# Patient Record
Sex: MALE | Race: ASIAN | Employment: FULL TIME | URBAN - METROPOLITAN AREA
[De-identification: names, ages, dates, MRNs, and addresses within clinical notes are randomized per-mention and may not be internally consistent; named-entity substitution may affect disease eponyms.]

---

## 2021-03-11 ENCOUNTER — OCC HEALTH (OUTPATIENT)
Dept: OTHER | Facility: HOSPITAL | Age: 41
End: 2021-03-11
Attending: PREVENTIVE MEDICINE

## 2021-03-11 ENCOUNTER — HOSPITAL ENCOUNTER (OUTPATIENT)
Dept: GENERAL RADIOLOGY | Facility: HOSPITAL | Age: 41
Discharge: HOME | End: 2021-03-11
Attending: PREVENTIVE MEDICINE

## 2021-03-11 DIAGNOSIS — S39.012A LUMBAR STRAIN: ICD-10-CM

## 2021-03-11 DIAGNOSIS — S39.012A LUMBAR STRAIN: Primary | ICD-10-CM

## 2021-03-11 PROCEDURE — 72110 X-RAY EXAM L-2 SPINE 4/>VWS: CPT | Performed by: PREVENTIVE MEDICINE

## 2021-03-11 RX ORDER — ACETAMINOPHEN AND CODEINE PHOSPHATE 300; 30 MG/1; MG/1
TABLET ORAL
Qty: 14 TABLET | Refills: 0 | Status: SHIPPED | OUTPATIENT
Start: 2021-03-11 | End: 2021-03-18

## 2021-03-11 NOTE — PATIENT INSTRUCTIONS
Rest    Ice to back on and off    For pain, may take ES Tylenol for pain  OR Tyl with codeine 1-2 tabs  every 6 hrs  As needed for pain      Text SUPPORT1 to 63384 to learn if you may be eligible for financial support with your medication(s).     [employer] doctor or health care professional if they continue or are bothersome):  · constipation  · dry mouth  · nausea, vomiting  · tiredness  What may interact with this medicine?   This medicine may interact with the following medications:  · alcohol  · antihista medicine may cause accidental overdose and death if it taken by other adults, children, or pets. Mix any unused medicine with a substance like cat litter or coffee grounds.  Then throw the medicine away in a sealed container like a sealed bag or a coffee ca to getting and using a drug for a nonmedical reason. If you have pain, you have a medical reason to take pain drug. Your health care provider will tell you how much drug to take.  If your health care provider wants you to stop the drug, the dose will be slo movement for 3 days, call your health care provider. Your mouth may get dry. Chewing sugarless gum or sucking hard candy and drinking plenty of water may help. Contact your health care provider if the problem does not go away or is severe.   NOTE:This shee inside the mouth  · signs and symptoms of low blood pressure like dizziness; feeling faint or lightheaded, falls; unusually weak or tired  · trouble passing urine or change in the amount of urine  · yellowing of the eyes or skin  Side effects that usually double or extra doses. Where should I keep my medicine? Keep out of the reach of children. This medicine can be abused. Keep your medicine in a safe place to protect it from theft. Do not share this medicine with anyone.  Selling or giving away this [employer] drug to take. Do not take more drug than directed. Get emergency help right away if you have problems breathing. Do not suddenly stop taking your drug because you may develop a severe reaction. Your body becomes used to the drug.  This does NOT mean you ar know how this drug affects you. Do not stand up or sit up quickly, especially if you are an older patient. This reduces the risk of dizzy or fainting spells. Alcohol may interfere with the effect of this drug. Avoid alcoholic drinks.   This drug will cause

## 2021-03-13 ENCOUNTER — OFFICE VISIT (OUTPATIENT)
Dept: OCCUPATIONAL MEDICINE | Age: 41
End: 2021-03-13
Attending: PHYSICIAN ASSISTANT

## 2021-03-13 DIAGNOSIS — M54.16 ACUTE LEFT LUMBAR RADICULOPATHY: ICD-10-CM

## 2021-03-13 DIAGNOSIS — S39.012D LOW BACK STRAIN, SUBSEQUENT ENCOUNTER: Primary | ICD-10-CM

## 2021-03-16 ENCOUNTER — APPOINTMENT (OUTPATIENT)
Dept: OTHER | Facility: HOSPITAL | Age: 41
End: 2021-03-16
Attending: PREVENTIVE MEDICINE

## 2021-03-18 ENCOUNTER — OFFICE VISIT (OUTPATIENT)
Dept: OTHER | Facility: HOSPITAL | Age: 41
End: 2021-03-18
Attending: PREVENTIVE MEDICINE

## 2021-03-18 RX ORDER — ACETAMINOPHEN AND CODEINE PHOSPHATE 300; 30 MG/1; MG/1
TABLET ORAL
Qty: 14 TABLET | Refills: 0 | Status: SHIPPED | OUTPATIENT
Start: 2021-03-18

## 2021-03-30 ENCOUNTER — APPOINTMENT (OUTPATIENT)
Dept: OTHER | Facility: HOSPITAL | Age: 41
End: 2021-03-30
Attending: PREVENTIVE MEDICINE

## 2021-04-01 ENCOUNTER — APPOINTMENT (OUTPATIENT)
Dept: OTHER | Facility: HOSPITAL | Age: 41
End: 2021-04-01
Attending: PREVENTIVE MEDICINE

## 2021-04-06 ENCOUNTER — ORDER TRANSCRIPTION (OUTPATIENT)
Dept: PHYSICAL THERAPY | Facility: HOSPITAL | Age: 41
End: 2021-04-06

## 2021-04-06 ENCOUNTER — TELEPHONE (OUTPATIENT)
Dept: NEUROLOGY | Facility: CLINIC | Age: 41
End: 2021-04-06

## 2021-04-06 DIAGNOSIS — S33.8XXA: ICD-10-CM

## 2021-04-06 DIAGNOSIS — M54.50 LUMBAR BACK PAIN: ICD-10-CM

## 2021-04-06 DIAGNOSIS — M54.50 LOW BACK PAIN: Primary | ICD-10-CM

## 2021-04-06 NOTE — TELEPHONE ENCOUNTER
[facility] to schedule PT to see Dr. Bhargav Esquivel for w/c visit. Auth and docs are scanned in.

## 2021-04-09 ENCOUNTER — OFFICE VISIT (OUTPATIENT)
Dept: PHYSICAL THERAPY | Facility: HOSPITAL | Age: 41
End: 2021-04-09
Attending: PREVENTIVE MEDICINE
Payer: OTHER MISCELLANEOUS

## 2021-04-09 DIAGNOSIS — M54.50 LUMBAR BACK PAIN: ICD-10-CM

## 2021-04-09 DIAGNOSIS — S33.8XXA: ICD-10-CM

## 2021-04-09 DIAGNOSIS — M54.50 LOW BACK PAIN: ICD-10-CM

## 2021-04-09 PROCEDURE — 97162 PT EVAL MOD COMPLEX 30 MIN: CPT

## 2021-04-09 PROCEDURE — 97110 THERAPEUTIC EXERCISES: CPT

## 2021-04-09 NOTE — PROGRESS NOTES
SPINE EVALUATION:   Referring Physician: Dr. Josy Bang  Diagnosis: Low back pain (M54.5)  Lumbar back pain (M54.5)  Sacrum sprain (X17.1OZR)        Date of Service: 4/9/2021     PATIENT Marla Robertson is a 47year old male who presents to [facility] objective tests and measures show restricted lumbar ROM with pain, positive left SLR, left lumbar paraspinals mm spasm, lumbar spine hypomobility with pain at L4/5 and L5/S1.   Functional deficits include but are not limited to walking, sit to stand transfe x 20 bouts; prone press ups 2 x 10; lumbar rotational mobilization Gr III (B) with cavitation on R side x 1; open book x 10 (B), HEP. Log rolling, sit to stand, walking.   Pt education was provided on exam findings, treatment diagnosis, treatment plan, expe lifting, pulling, pushing with <1/10 pain   · Pt will be independent and compliant with comprehensive HEP to maintain progress achieved in PT       Frequency / Duration: Patient will be seen for 2 x/week or a total of 9 visits over a 90 day period.  Treatme

## 2021-04-12 ENCOUNTER — OFFICE VISIT (OUTPATIENT)
Dept: PHYSICAL THERAPY | Facility: HOSPITAL | Age: 41
End: 2021-04-12
Attending: PREVENTIVE MEDICINE
Payer: OTHER MISCELLANEOUS

## 2021-04-12 PROCEDURE — 97140 MANUAL THERAPY 1/> REGIONS: CPT

## 2021-04-12 PROCEDURE — 97110 THERAPEUTIC EXERCISES: CPT

## 2021-04-12 NOTE — PROGRESS NOTES
Dx: Low back pain (M54.5) Lumbar back pain (M54.5) Sacrum sprain (S33.8XXA)         Insurance (Authorized # of Visits):  9 WC           Authorizing Physician: Dr. Francheska Oradz MD visit: none scheduled  Fall Risk: standard         Precautions: n/a independent and compliant with comprehensive HEP to maintain progress achieved in PT     Plan: add bridging  Date: 4/12/2021  TX#: 2/9 Date:                 TX#: 3/ Date:                 TX#: 4/ Date:                 TX#: 5/ Date:    Tx#: 6/   Man there  Ce

## 2021-04-14 ENCOUNTER — OFFICE VISIT (OUTPATIENT)
Dept: PHYSICAL THERAPY | Facility: HOSPITAL | Age: 41
End: 2021-04-14
Attending: PREVENTIVE MEDICINE
Payer: OTHER MISCELLANEOUS

## 2021-04-14 PROCEDURE — 97110 THERAPEUTIC EXERCISES: CPT

## 2021-04-14 PROCEDURE — 97140 MANUAL THERAPY 1/> REGIONS: CPT

## 2021-04-14 NOTE — PROGRESS NOTES
Dx: Low back pain (M54.5) Lumbar back pain (M54.5) Sacrum sprain (S33.8XXA)         Insurance (Authorized # of Visits):  9 WC           Authorizing Physician: Dr. Yvan Varela  Next MD visit: none scheduled  Fall Risk: standard         Precautions: n/a Date: 4/14/2021          TX#: 3/9 Date:                 TX#: 4/ Date:                 TX#: 5/ Date:    Tx#: 6/   Man there  Central/unilateral P-A grade II-III to improve lumbar mobility  STM to lumbar paraspinals, QL on L for pain releif  x11 min Man there

## 2021-04-19 ENCOUNTER — OFFICE VISIT (OUTPATIENT)
Dept: NEUROLOGY | Facility: CLINIC | Age: 41
End: 2021-04-19
Payer: OTHER MISCELLANEOUS

## 2021-04-19 VITALS — WEIGHT: 198 LBS | HEIGHT: 63 IN | BODY MASS INDEX: 35.08 KG/M2

## 2021-04-19 DIAGNOSIS — M47.816 LUMBAR SPONDYLOSIS: ICD-10-CM

## 2021-04-19 DIAGNOSIS — S33.5XXA LUMBAR SPRAIN, INITIAL ENCOUNTER: Primary | ICD-10-CM

## 2021-04-19 DIAGNOSIS — M51.36 DISC DEGENERATION, LUMBAR: ICD-10-CM

## 2021-04-19 PROCEDURE — 3008F BODY MASS INDEX DOCD: CPT | Performed by: PHYSICAL MEDICINE & REHABILITATION

## 2021-04-19 PROCEDURE — 99244 OFF/OP CNSLTJ NEW/EST MOD 40: CPT | Performed by: PHYSICAL MEDICINE & REHABILITATION

## 2021-04-19 RX ORDER — MELOXICAM 15 MG/1
15 TABLET ORAL DAILY
Qty: 30 TABLET | Refills: 0 | Status: SHIPPED | OUTPATIENT
Start: 2021-04-19

## 2021-04-19 NOTE — PROGRESS NOTES
[address]  Progress Note    CHIEF COMPLAINT:  Patient presents with:  Low Back Pain: New patient referred by Dr. Letty Prabhakar for low back pain.  Patient reports WC injury 03/11/21, states that we was pusing a tra tablet 0       ALLERGIES:   No Known Allergies    REVIEW OF SYSTEMS:   Patient-reported ROS  Constitutional  Sleep Disturbance: denies  Chills: denies  Fever: denies  Weight Gain: denies  Weight Loss: denies   Cardiovascular  Chest Pain: denies  Irregular epic.  He showed me a report indicating disc bulges at L3-4 and L4-5. Also an annular tear noted. ASSESSMENT AND PLAN:  1. Lumbar sprain, initial encounter  He seems to have left-sided facet syndrome. I showed him some stretches for this.   He is impro

## 2021-04-21 ENCOUNTER — TELEPHONE (OUTPATIENT)
Dept: NEUROLOGY | Facility: CLINIC | Age: 41
End: 2021-04-21

## 2021-04-23 ENCOUNTER — APPOINTMENT (OUTPATIENT)
Dept: PHYSICAL THERAPY | Facility: HOSPITAL | Age: 41
End: 2021-04-23
Attending: PREVENTIVE MEDICINE
Payer: OTHER MISCELLANEOUS

## 2021-04-23 ENCOUNTER — TELEPHONE (OUTPATIENT)
Dept: PHYSICAL THERAPY | Facility: HOSPITAL | Age: 41
End: 2021-04-23

## 2021-04-26 ENCOUNTER — OFFICE VISIT (OUTPATIENT)
Dept: PHYSICAL THERAPY | Facility: HOSPITAL | Age: 41
End: 2021-04-26
Attending: PREVENTIVE MEDICINE
Payer: OTHER MISCELLANEOUS

## 2021-04-26 PROCEDURE — 97110 THERAPEUTIC EXERCISES: CPT

## 2021-04-26 PROCEDURE — 97140 MANUAL THERAPY 1/> REGIONS: CPT

## 2021-04-26 NOTE — PROGRESS NOTES
Dx: Low back pain (M54.5) Lumbar back pain (M54.5) Sacrum sprain (S33.8XXA)         Insurance (Authorized # of Visits):  9 WC           Authorizing Physician: Dr. Delmis Hickey  Next MD visit: none scheduled  Fall Risk: standard         Precautions: n/a Plan: continue HS stretching  Date: 4/12/2021  TX#: 2/9 Date: 4/14/2021          TX#: 3/9 Date: 4/26/2021          TX#: 4/9 Date:                 TX#: 5/ Date:    Tx#: 6/   Man there  Central/unilateral P-A grade II-III to improve lumbar mobility  STM t

## 2021-04-28 ENCOUNTER — OFFICE VISIT (OUTPATIENT)
Dept: PHYSICAL THERAPY | Facility: HOSPITAL | Age: 41
End: 2021-04-28
Attending: PREVENTIVE MEDICINE
Payer: OTHER MISCELLANEOUS

## 2021-04-28 PROCEDURE — 97110 THERAPEUTIC EXERCISES: CPT

## 2021-04-28 NOTE — PROGRESS NOTES
Dx: Low back pain (M54.5) Lumbar back pain (M54.5) Sacrum sprain (S33.8XXA)         Insurance (Authorized # of Visits):  9 WC           Authorizing Physician: Dr. Stephanie Blanc  Next MD visit: none scheduled  Fall Risk: standard         Precautions: n/a 4/9 Date: 4/28/2021           TX#: 5/9 Date:    Tx#: 6/   Man there  Central/unilateral P-A grade II-III to improve lumbar mobility  STM to lumbar paraspinals, QL on L for pain releif  x11 min Man there  Central/unilateral P-A grade II-III to improve lumbar

## 2021-05-03 ENCOUNTER — APPOINTMENT (OUTPATIENT)
Dept: PHYSICAL THERAPY | Facility: HOSPITAL | Age: 41
End: 2021-05-03
Attending: PREVENTIVE MEDICINE
Payer: OTHER MISCELLANEOUS

## 2021-05-03 ENCOUNTER — TELEPHONE (OUTPATIENT)
Dept: PHYSICAL THERAPY | Facility: HOSPITAL | Age: 41
End: 2021-05-03

## 2021-05-05 ENCOUNTER — APPOINTMENT (OUTPATIENT)
Dept: PHYSICAL THERAPY | Facility: HOSPITAL | Age: 41
End: 2021-05-05
Attending: PREVENTIVE MEDICINE
Payer: OTHER MISCELLANEOUS

## 2021-05-12 ENCOUNTER — LAB REQUISITION (OUTPATIENT)
Dept: LAB | Age: 41
End: 2021-05-12

## 2021-05-12 DIAGNOSIS — Z00.00 ENCOUNTER FOR GENERAL ADULT MEDICAL EXAMINATION WITHOUT ABNORMAL FINDINGS: ICD-10-CM

## 2021-05-12 PROCEDURE — 86480 TB TEST CELL IMMUN MEASURE: CPT | Performed by: CLINICAL MEDICAL LABORATORY

## 2021-05-12 PROCEDURE — PSEU9049 QUANTIFERON TB PLUS: Performed by: CLINICAL MEDICAL LABORATORY

## 2021-05-14 LAB
GAMMA INTERFERON BACKGROUND BLD IA-ACNC: 0.09 IU/ML
M TB IFN-G BLD-IMP: POSITIVE
M TB IFN-G CD4+ BCKGRND COR BLD-ACNC: 1.03 IU/ML
M TB IFN-G CD4+CD8+ BCKGRND COR BLD-ACNC: 0.82 IU/ML
MITOGEN IGNF BCKGRD COR BLD-ACNC: 8.76 IU/ML

## 2021-05-17 ENCOUNTER — APPOINTMENT (OUTPATIENT)
Dept: PHYSICAL THERAPY | Facility: HOSPITAL | Age: 41
End: 2021-05-17
Attending: PREVENTIVE MEDICINE
Payer: COMMERCIAL

## 2021-05-19 ENCOUNTER — IMAGING SERVICES (OUTPATIENT)
Dept: GENERAL RADIOLOGY | Age: 41
End: 2021-05-19
Attending: NURSE PRACTITIONER

## 2021-05-19 ENCOUNTER — APPOINTMENT (OUTPATIENT)
Dept: PHYSICAL THERAPY | Facility: HOSPITAL | Age: 41
End: 2021-05-19
Payer: COMMERCIAL

## 2021-05-19 DIAGNOSIS — R76.12 POSITIVE QUANTIFERON-TB GOLD TEST: ICD-10-CM

## 2021-05-24 ENCOUNTER — APPOINTMENT (OUTPATIENT)
Dept: PHYSICAL THERAPY | Facility: HOSPITAL | Age: 41
End: 2021-05-24
Payer: COMMERCIAL

## 2021-05-26 ENCOUNTER — APPOINTMENT (OUTPATIENT)
Dept: PHYSICAL THERAPY | Facility: HOSPITAL | Age: 41
End: 2021-05-26
Payer: COMMERCIAL

## (undated) NOTE — LETTER
21          Thom Yanez  :  1980      To Whom It May Concern: This patient was seen in our office on 21 .       Work Duty Status:   Work Related: Yes  MMI:No  Work Status: Modified Work with Restrictions: (Note: If these restri

## (undated) NOTE — LETTER
[address] [address]   Date:   4/19/2021     Name:   Loren Barron    YOB: 1980   MRN:   [id number]       WHERE IS YOUR PAIN NOW?   Rey the areas on your body where you feel the described sensations

## (undated) NOTE — Clinical Note
Dear Dr. Salas Loomis,    Thank you for sending Lenell Lines to see me for physiatry consultation. I appreciate your confidence in me to care for your patients. Please feel free call me with any questions at [phone] or contact me through [address] [place] [address] Rd.     [facility]